# Patient Record
Sex: FEMALE | Race: WHITE | Employment: UNEMPLOYED | URBAN - METROPOLITAN AREA
[De-identification: names, ages, dates, MRNs, and addresses within clinical notes are randomized per-mention and may not be internally consistent; named-entity substitution may affect disease eponyms.]

---

## 2020-09-22 ENCOUNTER — APPOINTMENT (OUTPATIENT)
Dept: ULTRASOUND IMAGING | Age: 25
End: 2020-09-22
Payer: COMMERCIAL

## 2020-09-22 ENCOUNTER — HOSPITAL ENCOUNTER (EMERGENCY)
Age: 25
Discharge: HOME | End: 2020-09-23
Attending: EMERGENCY MEDICINE
Payer: COMMERCIAL

## 2020-09-22 LAB
A/G RATIO: 1.5 (ref 1.1–2.2)
ABO/RH: NORMAL
ALBUMIN SERPL-MCNC: 4.8 G/DL (ref 3.4–5)
ALP BLD-CCNC: 53 U/L (ref 40–129)
ALT SERPL-CCNC: 8 U/L (ref 10–40)
AMORPHOUS: ABNORMAL /HPF
ANION GAP SERPL CALCULATED.3IONS-SCNC: 10 MMOL/L (ref 3–16)
AST SERPL-CCNC: 11 U/L (ref 15–37)
BACTERIA WET PREP: ABNORMAL
BACTERIA: ABNORMAL /HPF
BASOPHILS ABSOLUTE: 0 K/UL (ref 0–0.2)
BASOPHILS RELATIVE PERCENT: 0.5 %
BILIRUB SERPL-MCNC: 0.4 MG/DL (ref 0–1)
BILIRUBIN URINE: NEGATIVE
BLOOD, URINE: NEGATIVE
BUN BLDV-MCNC: 9 MG/DL (ref 7–20)
CALCIUM SERPL-MCNC: 9.4 MG/DL (ref 8.3–10.6)
CHLORIDE BLD-SCNC: 100 MMOL/L (ref 99–110)
CLARITY: CLEAR
CLUE CELLS: ABNORMAL
CO2: 24 MMOL/L (ref 21–32)
COLOR: ABNORMAL
CREAT SERPL-MCNC: <0.5 MG/DL (ref 0.6–1.1)
EOSINOPHILS ABSOLUTE: 0 K/UL (ref 0–0.6)
EOSINOPHILS RELATIVE PERCENT: 0.5 %
EPITHELIAL CELLS WET PREP: ABNORMAL
EPITHELIAL CELLS, UA: ABNORMAL /HPF (ref 0–5)
GFR AFRICAN AMERICAN: >60
GFR NON-AFRICAN AMERICAN: >60
GLOBULIN: 3.2 G/DL
GLUCOSE BLD-MCNC: 88 MG/DL (ref 70–99)
GLUCOSE URINE: NEGATIVE MG/DL
GONADOTROPIN, CHORIONIC (HCG) QUANT: NORMAL MIU/ML
HCT VFR BLD CALC: 33.5 % (ref 36–48)
HEMOGLOBIN: 10.8 G/DL (ref 12–16)
KETONES, URINE: NEGATIVE MG/DL
LEUKOCYTE ESTERASE, URINE: NEGATIVE
LYMPHOCYTES ABSOLUTE: 1.5 K/UL (ref 1–5.1)
LYMPHOCYTES RELATIVE PERCENT: 22.7 %
MAGNESIUM: 2 MG/DL (ref 1.8–2.4)
MCH RBC QN AUTO: 26.1 PG (ref 26–34)
MCHC RBC AUTO-ENTMCNC: 32.3 G/DL (ref 31–36)
MCV RBC AUTO: 80.6 FL (ref 80–100)
MICROSCOPIC EXAMINATION: YES
MONOCYTES ABSOLUTE: 0.6 K/UL (ref 0–1.3)
MONOCYTES RELATIVE PERCENT: 8.4 %
MUCUS: ABNORMAL /LPF
NEUTROPHILS ABSOLUTE: 4.5 K/UL (ref 1.7–7.7)
NEUTROPHILS RELATIVE PERCENT: 67.9 %
NITRITE, URINE: NEGATIVE
PDW BLD-RTO: 17.6 % (ref 12.4–15.4)
PH UA: 8 (ref 5–8)
PLATELET # BLD: 262 K/UL (ref 135–450)
PMV BLD AUTO: 7.9 FL (ref 5–10.5)
POTASSIUM REFLEX MAGNESIUM: 3.5 MMOL/L (ref 3.5–5.1)
PROTEIN UA: ABNORMAL MG/DL
RBC # BLD: 4.16 M/UL (ref 4–5.2)
RBC UA: ABNORMAL /HPF (ref 0–4)
RBC WET PREP: ABNORMAL
SODIUM BLD-SCNC: 134 MMOL/L (ref 136–145)
SOURCE WET PREP: ABNORMAL
SPECIFIC GRAVITY UA: 1.02 (ref 1–1.03)
TOTAL PROTEIN: 8 G/DL (ref 6.4–8.2)
TRICHOMONAS PREP: ABNORMAL
URINE REFLEX TO CULTURE: ABNORMAL
URINE TYPE: ABNORMAL
UROBILINOGEN, URINE: 4 E.U./DL
WBC # BLD: 6.6 K/UL (ref 4–11)
WBC UA: ABNORMAL /HPF (ref 0–5)
WBC WET PREP: ABNORMAL
YEAST WET PREP: ABNORMAL

## 2020-09-22 PROCEDURE — 2580000003 HC RX 258: Performed by: PHYSICIAN ASSISTANT

## 2020-09-22 PROCEDURE — 81001 URINALYSIS AUTO W/SCOPE: CPT

## 2020-09-22 PROCEDURE — 87210 SMEAR WET MOUNT SALINE/INK: CPT

## 2020-09-22 PROCEDURE — 80053 COMPREHEN METABOLIC PANEL: CPT

## 2020-09-22 PROCEDURE — 36415 COLL VENOUS BLD VENIPUNCTURE: CPT

## 2020-09-22 PROCEDURE — 86901 BLOOD TYPING SEROLOGIC RH(D): CPT

## 2020-09-22 PROCEDURE — 76801 OB US < 14 WKS SINGLE FETUS: CPT

## 2020-09-22 PROCEDURE — 87491 CHLMYD TRACH DNA AMP PROBE: CPT

## 2020-09-22 PROCEDURE — 84702 CHORIONIC GONADOTROPIN TEST: CPT

## 2020-09-22 PROCEDURE — 86900 BLOOD TYPING SEROLOGIC ABO: CPT

## 2020-09-22 PROCEDURE — 87591 N.GONORRHOEAE DNA AMP PROB: CPT

## 2020-09-22 PROCEDURE — 99284 EMERGENCY DEPT VISIT MOD MDM: CPT

## 2020-09-22 PROCEDURE — 83735 ASSAY OF MAGNESIUM: CPT

## 2020-09-22 PROCEDURE — 85025 COMPLETE CBC W/AUTO DIFF WBC: CPT

## 2020-09-22 PROCEDURE — 86850 RBC ANTIBODY SCREEN: CPT

## 2020-09-22 RX ORDER — 0.9 % SODIUM CHLORIDE 0.9 %
1000 INTRAVENOUS SOLUTION INTRAVENOUS ONCE
Status: COMPLETED | OUTPATIENT
Start: 2020-09-22 | End: 2020-09-23

## 2020-09-22 RX ADMIN — SODIUM CHLORIDE 1000 ML: 9 INJECTION, SOLUTION INTRAVENOUS at 23:24

## 2020-09-22 ASSESSMENT — ENCOUNTER SYMPTOMS
SHORTNESS OF BREATH: 0
VOMITING: 0
ABDOMINAL PAIN: 1
BACK PAIN: 1

## 2020-09-22 ASSESSMENT — PAIN SCALES - GENERAL: PAINLEVEL_OUTOF10: 7

## 2020-09-22 ASSESSMENT — PAIN DESCRIPTION - ORIENTATION: ORIENTATION: LOWER

## 2020-09-22 ASSESSMENT — PAIN DESCRIPTION - DESCRIPTORS: DESCRIPTORS: CRAMPING

## 2020-09-22 ASSESSMENT — PAIN DESCRIPTION - LOCATION: LOCATION: ABDOMEN;BACK

## 2020-09-23 VITALS
OXYGEN SATURATION: 100 % | DIASTOLIC BLOOD PRESSURE: 69 MMHG | SYSTOLIC BLOOD PRESSURE: 115 MMHG | HEIGHT: 65 IN | BODY MASS INDEX: 18.33 KG/M2 | WEIGHT: 110 LBS | TEMPERATURE: 97.8 F | HEART RATE: 76 BPM | RESPIRATION RATE: 18 BRPM

## 2020-09-23 LAB
ANTIBODY SCREEN: NORMAL
C TRACH DNA GENITAL QL NAA+PROBE: NEGATIVE
N. GONORRHOEAE DNA: NEGATIVE
RHIG LOT NUMBER: NORMAL

## 2020-09-23 PROCEDURE — 6370000000 HC RX 637 (ALT 250 FOR IP): Performed by: EMERGENCY MEDICINE

## 2020-09-23 PROCEDURE — 96372 THER/PROPH/DIAG INJ SC/IM: CPT

## 2020-09-23 PROCEDURE — 6360000002 HC RX W HCPCS: Performed by: EMERGENCY MEDICINE

## 2020-09-23 RX ORDER — CLINDAMYCIN HYDROCHLORIDE 150 MG/1
300 CAPSULE ORAL ONCE
Status: COMPLETED | OUTPATIENT
Start: 2020-09-23 | End: 2020-09-23

## 2020-09-23 RX ORDER — CLINDAMYCIN HYDROCHLORIDE 300 MG/1
300 CAPSULE ORAL 3 TIMES DAILY
Qty: 21 CAPSULE | Refills: 0 | Status: SHIPPED | OUTPATIENT
Start: 2020-09-23 | End: 2020-09-30

## 2020-09-23 RX ADMIN — CLINDAMYCIN HYDROCHLORIDE 300 MG: 150 CAPSULE ORAL at 00:50

## 2020-09-23 RX ADMIN — HUMAN RHO(D) IMMUNE GLOBULIN 300 MCG: 300 INJECTION, SOLUTION INTRAMUSCULAR at 01:09

## 2020-09-23 NOTE — ED PROVIDER NOTES
[address] ED  EMERGENCY DEPARTMENT ENCOUNTER        Pt Name: Jennie Beckman  MRN: [id number]  [place] 1995  Date of evaluation: 9/22/2020  Provider: Anthony Laurent PA-C  PCP: No primary care provider on file. Shared Visit or Autonomous Visit:  I have seen and evaluated this patient with my supervising physician Rita Turcios MD.    [facility]       Chief Complaint   Patient presents with    Vaginal Bleeding     Patient states that she thinks that she is pregnant, and began to have vaginal bleeding today. LMP 08/06/20. Patient had positive pregnancy test after bleeding started today       HISTORY OF PRESENT ILLNESS   (Location/Symptom, Timing/Onset, Context/Setting, Quality, Duration, Modifying Factors, Severity)  Note limiting factors. Jennie Beckman is a 22 y.o. female presenting to the emergency department for evaluation of vaginal bleeding. States her period was supposed to start on 9/6 but it did not states she has had some lower abdominal and back cramping since 9/6 today started having vaginal bleeding. States she got a pain at her left lower abdomen prior to the bleeding starting. States she thought she was starting her period put in a tampon. States she has also had some nausea and some breast soreness because of the plate bleeding had concern for pregnancy she took a pregnancy test after the bleeding started and states was positive. 2 prior pregnancies without complication she has 2 children. Currently has low backache denies any abdominal pain. No vomiting. No fevers. The history is provided by the patient.    Vaginal Bleeding   Quality:  Typical of menses  Onset quality:  Sudden  Progression:  Unchanged  Chronicity:  New  Associated symptoms: abdominal pain and back pain    Associated symptoms: no dizziness, no dysuria, no fever and no vaginal discharge    Abdominal pain:     Location:  LLQ    Quality: cramping      Chronicity: New        Nursing Notes were reviewed    REVIEW OF SYSTEMS    (2-9 systems for level 4, 10 or more for level 5)     Review of Systems   Constitutional: Negative for fever. Respiratory: Negative for shortness of breath. Cardiovascular: Negative for chest pain. Gastrointestinal: Positive for abdominal pain. Negative for vomiting. Genitourinary: Positive for vaginal bleeding. Negative for difficulty urinating, dysuria and vaginal discharge. Musculoskeletal: Positive for back pain. Neurological: Negative for dizziness and syncope. All other systems reviewed and are negative. Positives and Pertinent negatives as per HPI. PAST MEDICAL HISTORY     Past Medical History:   Diagnosis Date    Arrhythmia     Patient states she has a \"irregular heartbeat\"    Hyperlipidemia     Hypertension     Syncopal episodes          SURGICAL HISTORY   History reviewed. No pertinent surgical history. CURRENTMEDICATIONS       Previous Medications    No medications on file         ALLERGIES     Patient has no known allergies. FAMILYHISTORY     History reviewed. No pertinent family history. SOCIAL HISTORY       Social History     Socioeconomic History    Marital status: Single     Spouse name: None    Number of children: None    Years of education: None    Highest education level: None   Occupational History    None   Social Needs    Financial resource strain: None    Food insecurity     Worry: None     Inability: None    Transportation needs     Medical: None     Non-medical: None   Tobacco Use    Smoking status: Current Every Day Smoker     Types: Cigarettes    Smokeless tobacco: Never Used   Substance and Sexual Activity    Alcohol use:  Yes    Drug use: Yes     Types: Marijuana    Sexual activity: None   Lifestyle    Physical activity     Days per week: None     Minutes per session: None    Stress: None   Relationships    Social connections     Talks on phone: None     Gets together: None     Attends Mor[employer] service: None     Active member of club or organization: None     Attends meetings of clubs or organizations: None     Relationship status: None    Intimate partner violence     Fear of current or ex partner: None     Emotionally abused: None     Physically abused: None     Forced sexual activity: None   Other Topics Concern    None   Social History Narrative    None       SCREENINGS    Shaila Coma Scale  Eye Opening: Spontaneous  Best Verbal Response: Oriented  Best Motor Response: Obeys commands  [place] Coma Scale Score: 15        PHYSICAL EXAM    (up to 7 for level 4, 8 or more for level 5)     ED Triage Vitals [09/22/20 2023]   BP Temp Temp Source Pulse Resp SpO2 Height Weight   126/75 98.2 °F (36.8 °C) Temporal 80 16 100 % 5' 5\" (1.651 m) 110 lb (49.9 kg)       Physical Exam  Vitals signs and nursing note reviewed. Exam conducted with a chaperone present. Constitutional:       Appearance: She is well-developed. She is not toxic-appearing. HENT:      Head: Normocephalic and atraumatic. Mouth/Throat:      Mouth: Mucous membranes are moist.      Pharynx: Oropharynx is clear. No pharyngeal swelling, oropharyngeal exudate or posterior oropharyngeal erythema. Eyes:      Conjunctiva/sclera: Conjunctivae normal.      Pupils: Pupils are equal, round, and reactive to light. Neck:      Musculoskeletal: Normal range of motion and neck supple. Vascular: No JVD. Cardiovascular:      Rate and Rhythm: Normal rate and regular rhythm. Pulses: Normal pulses. Heart sounds: Normal heart sounds. Pulmonary:      Effort: Pulmonary effort is normal. No respiratory distress. Breath sounds: Normal breath sounds. No stridor. No wheezing, rhonchi or rales. Abdominal:      General: Bowel sounds are normal. There is no distension. Palpations: Abdomen is soft. Abdomen is not rigid. There is no mass. Tenderness: There is no abdominal tenderness.  There is no right CVA tenderness, left CVA tenderness, guarding or rebound. Genitourinary:     General: Normal vulva. Labia:         Right: No rash, tenderness, lesion or injury. Left: No rash, tenderness, lesion or injury. Vagina: No signs of injury and foreign body. No vaginal discharge, erythema, tenderness or bleeding. Cervix: No discharge or cervical bleeding. Adnexa:         Right: No mass or tenderness. Left: No mass or tenderness. Comments: Cervix is closed and no active bleeding on exam  Musculoskeletal: Normal range of motion. Skin:     General: Skin is warm and dry. Findings: No rash. Neurological:      Mental Status: She is alert and oriented to person, place, and time. GCS: GCS eye subscore is 4. GCS verbal subscore is 5. GCS motor subscore is 6. Cranial Nerves: No cranial nerve deficit. Sensory: No sensory deficit. Motor: No abnormal muscle tone.       Coordination: Coordination normal.   Psychiatric:         Behavior: Behavior normal.         DIAGNOSTIC RESULTS   LABS:    Labs Reviewed   WET PREP, GENITAL - Abnormal; Notable for the following components:       Result Value    Clue Cells, Wet Prep <1+ (*)     All other components within normal limits    Narrative:     Performed at:  [facility],  [place], [facility]   Phone [phone]   CBC WITH AUTO DIFFERENTIAL - Abnormal; Notable for the following components:    Hemoglobin 10.8 (*)     Hematocrit 33.5 (*)     RDW 17.6 (*)     All other components within normal limits    Narrative:     Performed at:  [facility] [address],  [place] [facility]   Phone [phone]   COMPREHENSIVE METABOLIC PANEL W/ REFLEX TO MG FOR LOW K - Abnormal; Notable for the following components:    Sodium 134 (*)     CREATININE <0.5 (*)     ALT 8 (*)     AST 11 (*)     All other components within normal limits Narrative:     Performed at:  [facility],  [place], [place]   Phone [phone]   URINE RT REFLEX TO CULTURE - Abnormal; Notable for the following components:    Protein, UA TRACE (*)     Urobilinogen, Urine 4.0 (*)     All other components within normal limits    Narrative:     Performed at:  [facility],  [place]   Phone [phone]   MICROSCOPIC URINALYSIS - Abnormal; Notable for the following components:    Mucus, UA 2+ (*)     Epithelial Cells, UA 6-10 (*)     Bacteria, UA Rare (*)     All other components within normal limits    Narrative:     Performed at:  [facility],  [place]   Phone [phone]   C.TRACHOMATIS N.GONORRHOEAE DNA   HCG, QUANTITATIVE, PREGNANCY    Narrative:     Performed at:  [place] [address],  [place], [place]   Phone [phone]   MAGNESIUM    Narrative:     Performed at:  [place] [address],  [place], [place]   Phone [phone]   ABO/RH    Narrative:     Performed at:  [facility],  [employer], [place]   Phone [phone]   ANTIBODY SCREEN    Narrative:     Performed at:  [place] [address],  [employer], [place]   Phone [phone]   [address] [facility]     Results for orders placed or performed during the hospital encounter of 09/22/20   Wet prep, genital    Specimen: Vaginal   Result Value Ref Range    Trichomonas Prep None Seen     Yeast, Wet Prep None Seen     Clue Cells, Wet Prep <1+ (A)     WBC, Wet Prep <1+     RBC, Wet Prep None Seen     Epi Cells 2+     Bacteria <1+     Source Wet Prep Vaginal    CBC Auto Differential   Result Value Ref Range    WBC 6.6 4.0 - 11.0 K/uL    RBC 4.16 4.00 - 5.20 M/uL    Hemoglobin 10.8 (L) 12.0 - 16.0 g/dL    Hematocrit 33.5 (L) 36.0 - 48.0 %    MCV 80.6 80.0 - 100.0 fL    MCH 26.1 26.0 - 34.0 pg    MCHC 32.3 31.0 - 36.0 g/dL    RDW 17.6 (H) 12.4 - 15.4 %    Platelets 049 844 - 735 K/uL    MPV 7.9 5.0 - 10.5 fL    Neutrophils % 67.9 %    Lymphocytes % 22.7 %    Monocytes % 8.4 %    Eosinophils % 0.5 %    Basophils % 0.5 %    Neutrophils Absolute 4.5 1.7 - 7.7 K/uL    Lymphocytes Absolute 1.5 1.0 - 5.1 K/uL    Monocytes Absolute 0.6 0.0 - 1.3 K/uL    Eosinophils Absolute 0.0 0.0 - 0.6 K/uL    Basophils Absolute 0.0 0.0 - 0.2 K/uL   Comprehensive Metabolic Panel w/ Reflex to MG   Result Value Ref Range    Sodium 134 (L) 136 - 145 mmol/L    Potassium reflex Magnesium 3.5 3.5 - 5.1 mmol/L    Chloride 100 99 - 110 mmol/L    CO2 24 21 - 32 mmol/L    Anion Gap 10 3 - 16    Glucose 88 70 - 99 mg/dL    BUN 9 7 - 20 mg/dL    CREATININE <0.5 (L) 0.6 - 1.1 mg/dL    GFR Non-African American >60 >60    GFR African American >60 >60    Calcium 9.4 8.3 - 10.6 mg/dL    Total Protein 8.0 6.4 - 8.2 g/dL    Alb 4.8 3.4 - 5.0 g/dL    Albumin/Globulin Ratio 1.5 1.1 - 2.2    Total Bilirubin 0.4 0.0 - 1.0 mg/dL    Alkaline Phosphatase 53 40 - 129 U/L    ALT 8 (L) 10 - 40 U/L    AST 11 (L) 15 - 37 U/L    Globulin 3.2 g/dL   hCG, quantitative, pregnancy   Result Value Ref Range    hCG Quant 32196.0 <5.0 mIU/mL   Urinalysis Reflex to Culture    Specimen: Urine, clean catch   Result Value Ref Range    Color, UA Straw Straw/Yellow    Clarity, UA Clear Clear    Glucose, Ur Negative Negative mg/dL    Bilirubin Urine Negative Negative    Ketones, Urine Negative Negative mg/dL    Specific Gravity, UA 1.020 1.005 - 1.030    Blood, Urine Negative Negative    pH, UA 8.0 5.0 - 8.0    Protein, UA TRACE (A) Negative mg/dL    Urobilinogen, Urine 4.0 (A) <2.0 E.U./dL    Nitrite, Urine Negative Negative    Leukocyte Esterase, Urine Negative Negative    Microscopic Examination YES Urine Type NotGiven     Urine Reflex to Culture Not Indicated    Microscopic Urinalysis   Result Value Ref Range    Mucus, UA 2+ (A) None Seen /LPF    WBC, UA 3-5 0 - 5 /HPF    RBC, UA 0-2 0 - 4 /HPF    Epithelial Cells, UA 6-10 (A) 0 - 5 /HPF    Bacteria, UA Rare (A) None Seen /HPF    Amorphous, UA Rare /HPF   Magnesium   Result Value Ref Range    Magnesium 2.00 1.80 - 2.40 mg/dL   ABO/RH   Result Value Ref Range    ABO/Rh O NEG    ANTIBODY SCREEN   Result Value Ref Range    Antibody Screen NEG        All other labs were within normal range or not returned as of this dictation. EKG: All EKG's are interpreted by the Emergency Department Physician in the absence of a cardiologist.  Please see their note for interpretation of EKG. RADIOLOGY:   Non-plain film images such as CT, Ultrasound and MRI are read by the radiologist. Plain radiographic images are visualized andpreliminarily interpreted by the  ED Provider with the below findings:        Interpretation [facility] Radiologist below, if available at the time of this note:    US OB LESS THAN 14 [address] [facility]   Final Result   Single live intrauterine pregnancy with estimated ultrasound age 7 weeks 0   days. CELIA May 18, 2021 based on today's ultrasound. 3 cm subchorionic hematoma. Us Ob Less Than 14 Weeks Single Or First Gestation    Result Date: 9/22/2020  EXAMINATION: FIRST TRIMESTER OBSTETRIC ULTRASOUND 9/22/2020 TECHNIQUE: Transabdominal and transvaginal first trimester obstetric pelvic ultrasound was performed with color Doppler flow evaluation. COMPARISON: None HISTORY: ORDERING SYSTEM PROVIDED HISTORY: +pregnancy, bleeding r/o ectopic TECHNOLOGIST PROVIDED HISTORY: Reason for exam:->+pregnancy, bleeding r/o ectopic Reason for Exam: +pregnancy, bleeding r/o ectopic Acuity: Unknown Type of Exam: Unknown 2 week history left lower quadrant pain and spotting.  FINDINGS: Uterus: 10.6 x 4.9 x 6.6 cm Gestational Sac(s):  Single to call OB tomorrow for close follow-up appointment and she understands returning for worsening. I estimate there is LOW risk for ACUTE APPENDICITIS, BOWEL OBSTRUCTION, CHOLECYSTITIS, DIVERTICULITIS, INCARCERATED HERNIA, PANCREATITIS, PERFORATED BOWEL, BOWEL ISCHEMIA, GONADAL TORSION thus I consider the discharge disposition reasonable. Also, there is no evidence or peritonitis, sepsis, or toxicity. FINAL IMPRESSION      1. Threatened miscarriage    2. Subchorionic hemorrhage of placenta in first trimester, single or unspecified fetus    3. Right ovarian cyst    4. Lower abdominal pain    5. Bacterial vaginosis in pregnancy    6.  Need for rhogam due to Rh negative mother          DISPOSITION/PLAN   DISPOSITION     PATIENT [facility]:  [place] [facility]  [address] [facility]  [phone]  Go to   If symptoms worsen    Your obstetrician    Call today  For further evaluation in the next 2-3 days      DISCHARGE MEDICATIONS:  New Prescriptions    CLINDAMYCIN (CLEOCIN) 300 MG CAPSULE    Take 1 capsule by mouth 3 times daily for 7 days       DISCONTINUED MEDICATIONS:  Discontinued Medications    DIAZEPAM (VALIUM) 5 MG TABLET    Take 5 mg by mouth every 6 hours as needed for Anxiety    METOPROLOL SUCCINATE (TOPROL XL) 25 MG EXTENDED RELEASE TABLET    Take 25 mg by mouth daily    OMEPRAZOLE (PRILOSEC) 20 MG DELAYED RELEASE CAPSULE    Take 20 mg by mouth daily    PRAVASTATIN (PRAVACHOL) 10 MG TABLET    Take 10 mg by mouth daily              (Please note that portions ofthis note were completed with a voice recognition program.  Efforts were made to edit the dictations but occasionally words are mis-transcribed.)    Jeffery Ahuja PA-C (electronically signed)            Kelsy Sunshine PA-C  09/23/20 0093

## 2020-09-23 NOTE — ED PROVIDER NOTES
I independently performed a history and physical on [employer]. All diagnostic, treatment, and disposition decisions were made by myself in conjunction with the advanced practice provider. For further details of [facility] emergency department encounter, please see MEI Khan's documentation. Patient is a 59-year-old female who is  presenting today after having some cramping over the last couple of weeks but then worsening today around 5 PM and then at 6 PM she had a \"burst of blood\" after coughing. She states they went through one tampon but currently the bleeding has mostly stopped. She denies any urinary complaints. Other than some vaginal bleeding earlier today, no abnormal vaginal discharge. This is her third pregnancy. No prior miscarriages. She does complain of lower abdominal cramping worse on the right side. She also has been having some hot and cold chills over the last couple of weeks but denies any fever. No chest pain or shortness of breath. No cough or exposure to COVID that she is aware of. She is nauseated but no vomiting. She does have some lower back pain as well. She still has her appendix. She is blood type O- . Due to concern for bleeding while pregnant, she came to the emergency department for further evaluation. Physical:   Gen: No acute distress. AOx3. Psych: Normal mood and affect  HEENT: NCAT  Neck: supple  Cardiac: RRR, pulses 2+ in upper extremities  Lungs: C2AB, no R/R/W  Abdomen: soft and mild RLQ and suprapubic tenderness with no R/D/G  Neuro: moving all extremities equally      MDM: Patient was evaluated due to concern for vaginal bleeding while pregnant. Ultrasound did show concern for subchorionic hemorrhage but live intrauterine pregnancy with a heart rate of 126. She had mild right lower quadrant tenderness but with a normal white count and no fever. I have low suspicion for appendicitis at this time.   Upon repeat evaluation, she was well-appearing and in no acute distress. She received RhoGam while in the emergency department due to vaginal bleeding while pregnant. She was told to call her obstetrician tomorrow to discuss further follow-up due to concern for vaginal bleeding while pregnant. She was well-appearing and in no acute distress at time of discharge and felt comfortable with this plan. She was informed of the subchorionic hemorrhage along with the right ovarian cyst.  I did discuss with the patient that since she did have the lower abdominal discomfort worse in the right lower quadrant, there is still a slight chance this could be related to appendicitis but at this point I feel this is unlikely, especially with her having the vaginal bleeding and some free fluid in the pelvis. I did offer to do an MRI today if she would feel more comfortable with this but at this time she is declining and wants to go home. She is aware if it was appendicitis, it could cause severe disability or death. She knows to return to the ED in the next 6 to 12 hours for any worsening pain associate with vomiting, fever, other concerning symptoms, but otherwise follow-up with her obstetrician over the next couple of days for repeat evaluation. She had full mental capacity to make her own medical decisions and the patient and her significant other felt comfortable with this plan. Again, at this point I do believe that her lower abdominal cramping is most likely related to the recent vaginal bleeding from the subchorionic hemorrhage. He was also treated with clindamycin due to concern for bacterial vaginosis but also being in the first trimester and therefore Flagyl was avoided.      Lavinia Esparza MD  09/23/20 David Roa MD  09/23/20 [id number]

## 2023-01-30 SDOH — HEALTH STABILITY: PHYSICAL HEALTH: ON AVERAGE, HOW MANY MINUTES DO YOU ENGAGE IN EXERCISE AT THIS LEVEL?: 0 MIN

## 2023-01-30 SDOH — HEALTH STABILITY: PHYSICAL HEALTH: ON AVERAGE, HOW MANY DAYS PER WEEK DO YOU ENGAGE IN MODERATE TO STRENUOUS EXERCISE (LIKE A BRISK WALK)?: 0 DAYS

## 2023-01-30 ASSESSMENT — SOCIAL DETERMINANTS OF HEALTH (SDOH)
WITHIN THE LAST YEAR, HAVE YOU BEEN AFRAID OF YOUR PARTNER OR EX-PARTNER?: NO
WITHIN THE LAST YEAR, HAVE YOU BEEN KICKED, HIT, SLAPPED, OR OTHERWISE PHYSICALLY HURT BY YOUR PARTNER OR EX-PARTNER?: NO
WITHIN THE LAST YEAR, HAVE YOU BEEN HUMILIATED OR EMOTIONALLY ABUSED IN OTHER WAYS BY YOUR PARTNER OR EX-PARTNER?: NO
WITHIN THE LAST YEAR, HAVE TO BEEN RAPED OR FORCED TO HAVE ANY KIND OF SEXUAL ACTIVITY BY YOUR PARTNER OR EX-PARTNER?: NO

## 2023-01-31 ENCOUNTER — OFFICE VISIT (OUTPATIENT)
Dept: PRIMARY CARE CLINIC | Age: 28
End: 2023-01-31
Payer: COMMERCIAL

## 2023-01-31 VITALS
HEART RATE: 96 BPM | BODY MASS INDEX: 18.49 KG/M2 | DIASTOLIC BLOOD PRESSURE: 78 MMHG | OXYGEN SATURATION: 98 % | TEMPERATURE: 97.7 F | SYSTOLIC BLOOD PRESSURE: 116 MMHG | RESPIRATION RATE: 15 BRPM | WEIGHT: 111 LBS | HEIGHT: 65 IN

## 2023-01-31 DIAGNOSIS — Z13.1 DIABETES MELLITUS SCREENING: ICD-10-CM

## 2023-01-31 DIAGNOSIS — D50.9 IRON DEFICIENCY ANEMIA, UNSPECIFIED IRON DEFICIENCY ANEMIA TYPE: ICD-10-CM

## 2023-01-31 DIAGNOSIS — R63.4 UNINTENTIONAL WEIGHT LOSS: Primary | ICD-10-CM

## 2023-01-31 DIAGNOSIS — F41.9 ANXIETY: ICD-10-CM

## 2023-01-31 DIAGNOSIS — Z11.4 ENCOUNTER FOR SCREENING FOR HIV: ICD-10-CM

## 2023-01-31 DIAGNOSIS — Z11.59 ENCOUNTER FOR HEPATITIS C SCREENING TEST FOR LOW RISK PATIENT: ICD-10-CM

## 2023-01-31 PROBLEM — E78.5 HYPERLIPIDEMIA: Status: ACTIVE | Noted: 2023-01-31

## 2023-01-31 PROBLEM — D64.9 ANEMIA: Status: ACTIVE | Noted: 2023-01-31

## 2023-01-31 PROCEDURE — G8482 FLU IMMUNIZE ORDER/ADMIN: HCPCS | Performed by: FAMILY MEDICINE

## 2023-01-31 PROCEDURE — 1036F TOBACCO NON-USER: CPT | Performed by: FAMILY MEDICINE

## 2023-01-31 PROCEDURE — G8427 DOCREV CUR MEDS BY ELIG CLIN: HCPCS | Performed by: FAMILY MEDICINE

## 2023-01-31 PROCEDURE — G8419 CALC BMI OUT NRM PARAM NOF/U: HCPCS | Performed by: FAMILY MEDICINE

## 2023-01-31 PROCEDURE — 99203 OFFICE O/P NEW LOW 30 MIN: CPT | Performed by: FAMILY MEDICINE

## 2023-01-31 RX ORDER — HYDROXYZINE HYDROCHLORIDE 25 MG/1
25 TABLET, FILM COATED ORAL EVERY 6 HOURS PRN
COMMUNITY
Start: 2023-01-03

## 2023-01-31 RX ORDER — SUMATRIPTAN 50 MG/1
TABLET, FILM COATED ORAL
COMMUNITY
Start: 2023-01-03

## 2023-01-31 RX ORDER — EVOLOCUMAB 140 MG/ML
INJECTION, SOLUTION SUBCUTANEOUS
COMMUNITY

## 2023-01-31 ASSESSMENT — PATIENT HEALTH QUESTIONNAIRE - PHQ9
SUM OF ALL RESPONSES TO PHQ QUESTIONS 1-9: 0
SUM OF ALL RESPONSES TO PHQ QUESTIONS 1-9: 0
SUM OF ALL RESPONSES TO PHQ9 QUESTIONS 1 & 2: 0
SUM OF ALL RESPONSES TO PHQ QUESTIONS 1-9: 0
1. LITTLE INTEREST OR PLEASURE IN DOING THINGS: 0
2. FEELING DOWN, DEPRESSED OR HOPELESS: 0
SUM OF ALL RESPONSES TO PHQ QUESTIONS 1-9: 0

## 2023-01-31 NOTE — PATIENT INSTRUCTIONS
Please schedule with OB GYN for: Pelvic pain, bloating, frequent urination and weight loss - you likely need ultrasound. [employer] [place]. Please find our [facility] below for your convenience! CENTRAL LOCATIONS    1) [address]  [facility]  [address] [address] [place], [address]  Phone: [phone]    2) [address] [facility]. [address]  [facility], [address]  Phone: [place]    3) [address] [place], [address]  [address]  Phone: [phone]    6) [facility]  [address] [facility], [address]  Phone: [phone]    5) [facility]  [address]  Phone: [phone]    6) [address] [facility], [address] [facility], [address]  Phone: [phone]    7) [address] [facility] [address]  Phone: [phone]) [facility]  [address]  [place], [facility]  Phone: [phone]) [address] [facility], [address]. [place] [address] [facility]  Phone: [phone], [address]  Phone: [address] [place]    11) [facility]  [address] [facility], [address] [facility]  Phone: [phone] [facility]  3/3[address] [facility], [address] [place], [place] [facility] [address]  Phone: [phone]) [address] [facility], [address]  [address], [address] [facility]  Phone: [phone]) [facility]   [facility], [address] [facility]  Phone: [phone]

## 2023-01-31 NOTE — PROGRESS NOTES
Roberta Michael is a 32y.o. year old female here for:     Chief Complaint:    Chief Complaint   Patient presents with    hospitals Care    Weight Loss     Subjective: Today, her current concerns include:    HPI:  #Weight loss  - Onset/Context: Went from 160 or so lbs to 110 lbs unintentionally and continues to drop after son was born (who is 3year old). Does use THC at night (to help her sleep, not medical grade) for years, stopped with pregnancy and then resumed. Appetite is poor, worse in the past year. Had an EGD and this was unremarkable per her report. - Progression: Worsening  - Associated Symptoms: Per ROS as otherwise stated in this note. Also feels she is nausea, having bloating, pelvic pain, frequent urination at times, mid cycle bleeding, hair loss, hot flashes/chills/sweats and feeling weak. - Previous occurrence: Never before    Past Medical History:   Diagnosis Date    Anemia     Anxiety     Arrhythmia     Patient states she has a \"irregular heartbeat\"    Depression     Headache     History of asthma     Hyperlipidemia     Hypertension     Syncopal episodes      Social History     Tobacco Use    Smoking status: Never    Smokeless tobacco: Never   Vaping Use    Vaping Use: Never used   Substance Use Topics    Alcohol use:  Yes     Alcohol/week: 1.0 standard drink     Types: 1 Glasses of wine per week     Comment: 1 drink per week    Drug use: Yes     Frequency: 7.0 times per week     Types: Marijuana [place])     Comment: Inhaled - not medical grade     Family History   Problem Relation Age of Onset    Heart Attack Father     High Cholesterol Father     Breast Cancer Neg Hx     Colon Cancer Neg Hx      Past Surgical History:   Procedure Laterality Date    WISDOM TOOTH EXTRACTION           Current Outpatient Medications:     hydrOXYzine HCl (ATARAX) 25 MG tablet, Take 25 mg by mouth every 6 hours as needed, Disp: , Rfl:     Evolocumab (REPATHA) SOSY syringe, Inject into the skin every 14 days, Disp: , Rfl:     SUMAtriptan (IMITREX) 50 MG tablet, TAKE 1 TAB AT ONSET OF HEADACHE REPEAT DOSE IN 2 HRS. AS NEEDED. DO NOT EXCEED 2 DOSES IN 24 HRS., Disp: , Rfl:   Allergies   Allergen Reactions    Sertraline Hives and Itching    Escitalopram Hives     Teeth clenching. Teeth clenching. Review of Systems:  Review of Systems   Constitutional:  Positive for chills, diaphoresis, fatigue and unexpected weight change (loss). HENT:  Negative for congestion. Respiratory:  Negative for cough. Genitourinary:  Positive for frequency, menstrual problem and pelvic pain. Negative for dysuria. Objective:    Physical Exam:  Vitals:    01/31/23 1548   BP: 116/78   Site: Right Upper Arm   Position: Sitting   Cuff Size: Small Adult   Pulse: 96   Resp: 15   Temp: 97.7 °F (36.5 °C)   TempSrc: Temporal   SpO2: 98%   Weight: 111 lb (50.3 kg)   Height: 5' 5\" (1.651 m)     Physical Exam  Constitutional:       General: She is not in acute distress. Appearance: Normal appearance. She is not ill-appearing, toxic-appearing or diaphoretic. HENT:      Head: Normocephalic and atraumatic. Right Ear: External ear normal.      Left Ear: External ear normal.   Eyes:      General: No scleral icterus. Cardiovascular:      Rate and Rhythm: Normal rate and regular rhythm. Pulses: Normal pulses. Heart sounds: Normal heart sounds. No murmur heard. No friction rub. No gallop. Pulmonary:      Effort: Pulmonary effort is normal. No respiratory distress. Breath sounds: Normal breath sounds. No stridor. No wheezing, rhonchi or rales. Chest:      Chest wall: No tenderness. Abdominal:      Palpations: Abdomen is soft. Skin:     General: Skin is warm and dry. Capillary Refill: Capillary refill takes less than 2 seconds. Neurological:      Mental Status: She is alert. Body mass index is 18.47 kg/m².     Labs:  No results found for this or any previous visit (from the past 672 hour(s)). Imaging:  XR CHEST (2 VW)    (Results Pending)     ASSESSMENT & PLAN:    Montserrat Rosales is a 32y.o. year old female here for Establish Care and Weight Loss  . The following is a summary of the plan made at this visit:    1. Unintentional weight loss  Assessment & Plan:  Poorly controlled, unclear etiology, will work up: labs as noted to eval thyroid, r/o diabetes, HIV and any contaminants in her THC which may be adding to weight loss. CXR also ordered. Discussed that N/V and appetite changes may also be due to [facility] use - cessation encouraged. No restrictive/binging eating behaviors reported or identified. Advised to calorie track to ensure she is eating an adequate level of calories. Anxiety referral to manage this component as well. Stringly encouraged to also f/u with OB given hx of pelvic pain, bloating and weight loss for eval (ovarian CA most worrisome) - she stated she would schedule. Orders:  -     TSH with Reflex; Future  -     Drug Panel-PM-HI Res-UR Interp-A; Future  -     Comprehensive Metabolic Panel; Future  -     HIV Screen; Future  -     XR CHEST (2 VW); Future  -     CBC with Auto Differential; Future  2. Iron deficiency anemia, unspecified iron deficiency anemia type  Assessment & Plan:  Unclear etiology - possibly nutritional. She struggles to take daily Fe, discussed QOD dosing, she stated she would consider. Will check CBC today. Orders:  -     Rachael Jaimes MD, Hematology, [facility]  3. Anxiety  Assessment & Plan:  Poorly controlled. Referral to carol placed today. Orders:  -     [address] [place] [address]  4. Encounter for hepatitis C screening test for low risk patient  Assessment & Plan:  Age appropriate screening provided as per orders below. Orders:  -     Hepatitis C Antibody; Future  5. Encounter for screening for HIV  Assessment & Plan:  Age appropriate screening provided as per orders below. Orders:  -     HIV Screen; Future  6.  Diabetes mellitus screening  Assessment & Plan:  Age appropriate screening provided as per orders below. Orders:  -     Hemoglobin A1C; Future  -     Comprehensive Metabolic Panel; Future     I attest that on 02/01/23 , I spent a total of 46 minutes, in addition to face-to-face time during the visit, reviewing the patient's records and labs with the patient/guardian in the visit, counseling the patient/guardian on the above noted plan, ordering imaging studies (for example, x-rays, CT, MRI, ultrasound), ordering blood work and/or urine, and documenting the encounter after the visit.

## 2023-02-01 DIAGNOSIS — Z13.1 DIABETES MELLITUS SCREENING: ICD-10-CM

## 2023-02-01 DIAGNOSIS — Z11.4 ENCOUNTER FOR SCREENING FOR HIV: ICD-10-CM

## 2023-02-01 DIAGNOSIS — Z11.59 ENCOUNTER FOR HEPATITIS C SCREENING TEST FOR LOW RISK PATIENT: ICD-10-CM

## 2023-02-01 DIAGNOSIS — R63.4 UNINTENTIONAL WEIGHT LOSS: ICD-10-CM

## 2023-02-01 LAB
A/G RATIO: 1.5 (ref 1.1–2.2)
ALBUMIN SERPL-MCNC: 4.4 G/DL (ref 3.4–5)
ALP BLD-CCNC: 63 U/L (ref 40–129)
ALT SERPL-CCNC: 12 U/L (ref 10–40)
ANION GAP SERPL CALCULATED.3IONS-SCNC: 11 MMOL/L (ref 3–16)
AST SERPL-CCNC: 13 U/L (ref 15–37)
BASOPHILS ABSOLUTE: 0 K/UL (ref 0–0.2)
BASOPHILS RELATIVE PERCENT: 0.2 %
BILIRUB SERPL-MCNC: <0.2 MG/DL (ref 0–1)
BUN BLDV-MCNC: 11 MG/DL (ref 7–20)
CALCIUM SERPL-MCNC: 9.2 MG/DL (ref 8.3–10.6)
CHLORIDE BLD-SCNC: 102 MMOL/L (ref 99–110)
CO2: 25 MMOL/L (ref 21–32)
CREAT SERPL-MCNC: 0.5 MG/DL (ref 0.6–1.1)
EOSINOPHILS ABSOLUTE: 0.1 K/UL (ref 0–0.6)
EOSINOPHILS RELATIVE PERCENT: 1.5 %
GFR SERPL CREATININE-BSD FRML MDRD: >60 ML/MIN/{1.73_M2}
GLUCOSE BLD-MCNC: 90 MG/DL (ref 70–99)
HCT VFR BLD CALC: 34.1 % (ref 36–48)
HEMOGLOBIN: 10.9 G/DL (ref 12–16)
HEPATITIS C ANTIBODY INTERPRETATION: NORMAL
LYMPHOCYTES ABSOLUTE: 1.4 K/UL (ref 1–5.1)
LYMPHOCYTES RELATIVE PERCENT: 33.5 %
MCH RBC QN AUTO: 25.7 PG (ref 26–34)
MCHC RBC AUTO-ENTMCNC: 31.9 G/DL (ref 31–36)
MCV RBC AUTO: 80.7 FL (ref 80–100)
MONOCYTES ABSOLUTE: 0.4 K/UL (ref 0–1.3)
MONOCYTES RELATIVE PERCENT: 9.4 %
NEUTROPHILS ABSOLUTE: 2.3 K/UL (ref 1.7–7.7)
NEUTROPHILS RELATIVE PERCENT: 55.4 %
PDW BLD-RTO: 17.3 % (ref 12.4–15.4)
PLATELET # BLD: 212 K/UL (ref 135–450)
PMV BLD AUTO: 8.1 FL (ref 5–10.5)
POTASSIUM SERPL-SCNC: 3.8 MMOL/L (ref 3.5–5.1)
RBC # BLD: 4.23 M/UL (ref 4–5.2)
SODIUM BLD-SCNC: 138 MMOL/L (ref 136–145)
TOTAL PROTEIN: 7.3 G/DL (ref 6.4–8.2)
TSH REFLEX: 0.8 UIU/ML (ref 0.27–4.2)
WBC # BLD: 4.2 K/UL (ref 4–11)

## 2023-02-01 ASSESSMENT — ENCOUNTER SYMPTOMS: COUGH: 0

## 2023-02-01 NOTE — ASSESSMENT & PLAN NOTE
Poorly controlled, unclear etiology, will work up: labs as noted to eval thyroid, r/o diabetes, HIV and any contaminants in her THC which may be adding to weight loss. CXR also ordered. Discussed that N/V and appetite changes may also be due to [facility] use - cessation encouraged. No restrictive/binging eating behaviors reported or identified. Advised to calorie track to ensure she is eating an adequate level of calories. Anxiety referral to manage this component as well. Stringly encouraged to also f/u with OB given hx of pelvic pain, bloating and weight loss for eval (ovarian CA most worrisome) - she stated she would schedule.

## 2023-02-01 NOTE — ASSESSMENT & PLAN NOTE
Poorly controlled, unclear etiology, will work up: labs as noted to eval thyroid, r/o diabetes, HIV and any contaminants in her THC which may be adding to weight loss. CXR also ordered. Discussed that N/V and appetite changes may also be due to [facility] use - cessation encouraged. No restrictive/binging eating behaviors reported or identified. Advised to calorie track to ensure she is eating an adequate level of calories. Anxiety referral to manage this component as well.

## 2023-02-01 NOTE — ASSESSMENT & PLAN NOTE
Unclear etiology - possibly nutritional. She struggles to take daily Fe, discussed QOD dosing, she stated she would consider. Will check CBC today.

## 2023-02-02 DIAGNOSIS — D64.9 LOW HEMOGLOBIN: Primary | ICD-10-CM

## 2023-02-02 DIAGNOSIS — D64.9 LOW HEMOGLOBIN: ICD-10-CM

## 2023-02-02 LAB
ESTIMATED AVERAGE GLUCOSE: 99.7 MG/DL
HBA1C MFR BLD: 5.1 %
HIV AG/AB: NORMAL
HIV ANTIGEN: NORMAL
HIV-1 ANTIBODY: NORMAL
HIV-2 AB: NORMAL

## 2023-02-04 LAB
6-ACETYLMORPHINE: NOT DETECTED
7-AMINOCLONAZEPAM: NOT DETECTED
ALPHA-OH-ALPRAZOLAM: NOT DETECTED
ALPHA-OH-MIDAZOLAM, URINE: NOT DETECTED
ALPRAZOLAM: NOT DETECTED
AMPHETAMINE: NOT DETECTED
BARBITURATES: NOT DETECTED
BENZOYLECGONINE: NOT DETECTED
BUPRENORPHINE: NOT DETECTED
CARISOPRODOL: NOT DETECTED
CLONAZEPAM: NOT DETECTED
CODEINE: NOT DETECTED
CREATININE URINE: 144.8 MG/DL (ref 20–400)
DIAZEPAM: NOT DETECTED
DRUGS EXPECTED: NORMAL
EER PAIN MGT DRUG PANEL, HIGH RES/EMIT U: NORMAL
ETHYL GLUCURONIDE: NOT DETECTED
FENTANYL: NOT DETECTED
GABAPENTIN: NOT DETECTED
HYDROCODONE: NOT DETECTED
HYDROMORPHONE: NOT DETECTED
LORAZEPAM: NOT DETECTED
MARIJUANA METABOLITE: PRESENT
MDA: NOT DETECTED
MDEA: NOT DETECTED
MDMA URINE: NOT DETECTED
MEPERIDINE: NOT DETECTED
METHADONE: NOT DETECTED
METHAMPHETAMINE: NOT DETECTED
METHYLPHENIDATE: NOT DETECTED
MIDAZOLAM: NOT DETECTED
MORPHINE: NOT DETECTED
NALOXONE: NOT DETECTED
NORBUPRENORPHINE, FREE: NOT DETECTED
NORDIAZEPAM: NOT DETECTED
NORFENTANYL: NOT DETECTED
NORHYDROCODONE, URINE: NOT DETECTED
NOROXYCODONE: NOT DETECTED
NOROXYMORPHONE, URINE: NOT DETECTED
OXAZEPAM: NOT DETECTED
OXYCODONE: NOT DETECTED
OXYMORPHONE: NOT DETECTED
PAIN MANAGEMENT DRUG PANEL: NORMAL
PAIN MANAGEMENT DRUG PANEL: NORMAL
PCP: NOT DETECTED
PHENTERMINE: NOT DETECTED
PREGABALIN: NOT DETECTED
TAPENTADOL, URINE: NOT DETECTED
TAPENTADOL-O-SULFATE, URINE: NOT DETECTED
TEMAZEPAM: NOT DETECTED
TRAMADOL: NOT DETECTED
ZOLPIDEM: NOT DETECTED

## 2023-02-07 LAB — HEMOGLOBIN ELECTROPHORESIS: NORMAL

## 2023-02-08 LAB — HGB ELECTROPHORESIS INTERP: NORMAL

## 2023-02-16 DIAGNOSIS — R14.0 BLOATING: ICD-10-CM

## 2023-02-16 DIAGNOSIS — R10.2 PELVIC PAIN: ICD-10-CM

## 2023-02-16 DIAGNOSIS — R63.4 UNINTENTIONAL WEIGHT LOSS: Primary | ICD-10-CM

## 2023-03-02 PROBLEM — Z11.59 ENCOUNTER FOR HEPATITIS C SCREENING TEST FOR LOW RISK PATIENT: Status: RESOLVED | Noted: 2023-01-31 | Resolved: 2023-03-02

## 2023-03-02 PROBLEM — Z13.1 DIABETES MELLITUS SCREENING: Status: RESOLVED | Noted: 2023-01-31 | Resolved: 2023-03-02

## 2023-03-02 PROBLEM — Z11.4 ENCOUNTER FOR SCREENING FOR HIV: Status: RESOLVED | Noted: 2023-01-31 | Resolved: 2023-03-02

## 2023-03-03 ENCOUNTER — TELEPHONE (OUTPATIENT)
Dept: PRIMARY CARE CLINIC | Age: 28
End: 2023-03-03

## 2023-03-03 RX ORDER — NORELGESTROMIN AND ETHINLY ESTRADIOL 150; 35 UG/D; UG/D
PATCH TRANSDERMAL
COMMUNITY
Start: 2023-02-07

## 2023-03-03 NOTE — TELEPHONE ENCOUNTER
Called to remind her to do her chest xray and schedule OB GYN. No answer received, message left asking for call back.

## 2023-03-09 ENCOUNTER — TELEPHONE (OUTPATIENT)
Dept: PRIMARY CARE CLINIC | Age: 28
End: 2023-03-09

## 2023-03-09 DIAGNOSIS — R63.4 UNINTENTIONAL WEIGHT LOSS: Primary | ICD-10-CM

## 2023-03-09 NOTE — TELEPHONE ENCOUNTER
Spoke with the patient, she is still losing weight. Has not been able to f/u with GYN or get CXR due to being busy at work. GI referral placed today and provided her the info to schedule.

## 2023-03-29 ENCOUNTER — OFFICE VISIT (OUTPATIENT)
Dept: GYNECOLOGY | Age: 28
End: 2023-03-29
Payer: COMMERCIAL

## 2023-03-29 VITALS
TEMPERATURE: 97.6 F | DIASTOLIC BLOOD PRESSURE: 78 MMHG | WEIGHT: 114.6 LBS | HEART RATE: 93 BPM | RESPIRATION RATE: 12 BRPM | BODY MASS INDEX: 19.07 KG/M2 | OXYGEN SATURATION: 99 % | SYSTOLIC BLOOD PRESSURE: 116 MMHG

## 2023-03-29 DIAGNOSIS — D50.0 IRON DEFICIENCY ANEMIA DUE TO CHRONIC BLOOD LOSS: ICD-10-CM

## 2023-03-29 DIAGNOSIS — R63.0 POOR APPETITE: ICD-10-CM

## 2023-03-29 DIAGNOSIS — R53.83 OTHER FATIGUE: ICD-10-CM

## 2023-03-29 DIAGNOSIS — R63.4 WEIGHT LOSS: ICD-10-CM

## 2023-03-29 DIAGNOSIS — R10.2 PELVIC PAIN: ICD-10-CM

## 2023-03-29 DIAGNOSIS — N93.9 ABNORMAL UTERINE BLEEDING (AUB): Primary | ICD-10-CM

## 2023-03-29 DIAGNOSIS — N94.6 DYSMENORRHEA: ICD-10-CM

## 2023-03-29 PROCEDURE — G8427 DOCREV CUR MEDS BY ELIG CLIN: HCPCS | Performed by: OBSTETRICS & GYNECOLOGY

## 2023-03-29 PROCEDURE — G8482 FLU IMMUNIZE ORDER/ADMIN: HCPCS | Performed by: OBSTETRICS & GYNECOLOGY

## 2023-03-29 PROCEDURE — 99204 OFFICE O/P NEW MOD 45 MIN: CPT | Performed by: OBSTETRICS & GYNECOLOGY

## 2023-03-29 PROCEDURE — 1036F TOBACCO NON-USER: CPT | Performed by: OBSTETRICS & GYNECOLOGY

## 2023-03-29 PROCEDURE — G8420 CALC BMI NORM PARAMETERS: HCPCS | Performed by: OBSTETRICS & GYNECOLOGY

## 2023-03-29 RX ORDER — ETONOGESTREL AND ETHINYL ESTRADIOL 11.7; 2.7 MG/1; MG/1
1 INSERT, EXTENDED RELEASE VAGINAL
Qty: 3 EACH | Refills: 3 | Status: SHIPPED | OUTPATIENT
Start: 2023-03-29

## 2023-03-29 NOTE — LETTER
March 29, 2023      Stanley Mayfield, [address] [facility] [address] [facility]      Patient: Kelle Mcginnis   MR Number: [id number]   YOB: 1995   Date of Visit: 3/29/2023       Dear Stanley Mayfield: Thank you for referring Man Torres to me for evaluation/treatment. Below are the relevant portions of my assessment and plan of care. April Wallace was seen today accompanied by her mother with multiple somatic complaints. Pelvic ultrasound and  level were sent. She is started on the NuvaRing. If you have questions, please do not hesitate to call me. I look forward to following April Wallace along with you.     Sincerely,        Elver Friend MD

## 2023-03-29 NOTE — PROGRESS NOTES
HEADACHE REPEAT DOSE IN 2 HRS. AS NEEDED. DO NOT EXCEED 2 DOSES IN 24 HRS. Evolocumab (REPATHA) SOSY syringe Inject into the skin every 14 days       No current facility-administered medications on file prior to visit. Allergy: Sertraline and Escitalopram  Social History     Tobacco Use    Smoking status: Never    Smokeless tobacco: Never   Substance Use Topics    Alcohol use: Yes     Alcohol/week: 1.0 standard drink     Types: 1 Glasses of wine per week     Comment: 1 drink per week     Family History   Problem Relation Age of Onset    Heart Attack Father     High Cholesterol Father     Breast Cancer Neg Hx     Colon Cancer Neg Hx      Social History     Socioeconomic History    Marital status: Single     Spouse name: Not on file    Number of children: Not on file    Years of education: Not on file    Highest education level: Not on file   Occupational History    Not on file   Tobacco Use    Smoking status: Never    Smokeless tobacco: Never   Vaping Use    Vaping Use: Never used   Substance and Sexual Activity    Alcohol use:  Yes     Alcohol/week: 1.0 standard drink     Types: 1 Glasses of wine per week     Comment: 1 drink per week    Drug use: Yes     Frequency: 7.0 times per week     Types: Marijuana [place])     Comment: Inhaled - not medical grade    Sexual activity: Yes     Partners: Male     Comment: Monogamous   Other Topics Concern    Not on file   Social History Narrative    Not on file     Social Determinants of Health     Financial Resource Strain: Not on file   Food Insecurity: Not on file   Transportation Needs: Not on file   Physical Activity: Inactive    Days of Exercise per Week: 0 days    Minutes of Exercise per Session: 0 min   Stress: Not on file   Social Connections: Not on file   Intimate Partner Violence: Not At Risk    Fear of Current or Ex-Partner: No    Emotionally Abused: No    Physically Abused: No    Sexually Abused: No   Housing Stability: Not on file       Physical exam:  BP

## 2023-03-30 LAB — CANCER AG125 SERPL-ACNC: 17.5 U/ML (ref 0–35)

## 2023-04-28 ENCOUNTER — TELEPHONE (OUTPATIENT)
Dept: PRIMARY CARE CLINIC | Age: 28
End: 2023-04-28

## 2023-04-28 NOTE — TELEPHONE ENCOUNTER
Called patient to check in on status of GI appt, weight loss as well as CXR which she has not yet done. Will try to get records from GI. Called patient about this, no answer received, message left asking for call back.

## 2023-05-22 ENCOUNTER — TELEPHONE (OUTPATIENT)
Dept: PRIMARY CARE CLINIC | Age: 28
End: 2023-05-22

## 2023-05-22 NOTE — TELEPHONE ENCOUNTER
Called patient to check in on status of GI appt, weight loss as well as CXR which she has not yet done. No answer received, message left asking for call back.

## 2023-06-27 ENCOUNTER — TELEPHONE (OUTPATIENT)
Dept: PRIMARY CARE CLINIC | Age: 28
End: 2023-06-27

## 2025-04-28 ENCOUNTER — TELEPHONE (OUTPATIENT)
Dept: CARDIOLOGY CLINIC | Age: 30
End: 2025-04-28

## 2025-04-28 NOTE — TELEPHONE ENCOUNTER
Attempted to reach pt on 4/18 and 4/28 regarding a referral. LVM advising pt to call the office and schedule appt.